# Patient Record
Sex: FEMALE | Race: WHITE | URBAN - METROPOLITAN AREA
[De-identification: names, ages, dates, MRNs, and addresses within clinical notes are randomized per-mention and may not be internally consistent; named-entity substitution may affect disease eponyms.]

---

## 2022-11-21 ENCOUNTER — APPOINTMENT (RX ONLY)
Dept: URBAN - METROPOLITAN AREA OTHER 5 | Facility: OTHER | Age: 27
Setting detail: DERMATOLOGY
End: 2022-11-21

## 2022-11-21 DIAGNOSIS — Z41.1 ENCOUNTER FOR COSMETIC SURGERY: ICD-10-CM

## 2022-11-21 PROCEDURE — ? BOTOX

## 2022-11-21 PROCEDURE — ? PATIENT SPECIFIC COUNSELING

## 2022-11-21 PROCEDURE — ? CONSULTATION - AGING FACE

## 2022-11-21 PROCEDURE — ? FILLERS

## 2022-11-21 ASSESSMENT — LOCATION SIMPLE DESCRIPTION DERM: LOCATION SIMPLE: LEFT CHEEK

## 2022-11-21 ASSESSMENT — LOCATION DETAILED DESCRIPTION DERM: LOCATION DETAILED: LEFT INFERIOR CENTRAL MALAR CHEEK

## 2022-11-21 ASSESSMENT — LOCATION ZONE DERM: LOCATION ZONE: FACE

## 2022-11-21 NOTE — PROCEDURE: BOTOX
Consent: Written consent was obtained prior to the procedure. Risks, benefits, expectations and alternatives were discussed including, but not limited to, infection, bleeding, lid/brow ptosis, bruising, swelling, diplopia, temporary effects, incomplete chemical denervation and dissatisfaction with the cosmetic outcome. No guarantee or warranty was given or implied regarding longevity of results.
Dilution (U/0.1 Cc): 2.2
Gregorio Units: 0
Price Per Unit In $ (Use Numbers Only, No Text Please.): 12
Expiration Date (Month Year): 08/2024
Reconstitution Date: 11/22
Detail Level: Detailed
Use Map Statement For Sites (Optional): Yes
Glabellar Complex Units: 15
Bill Summary Price Listed Below, Or Bill Total Of Units X Price Per Unit?: Bill #Units x Price Per Unit
Postcare Instructions: Patient instructed to not lie down for 4 hours and limit physical activity for 24 hours. Patient instructed not to travel by airplane for 48 hours.
Map Statment: See attached map for complete details

## 2022-11-21 NOTE — PROCEDURE: FILLERS
Additional Area 1 Volume In Cc: 0
Topical Anesthesia?: yes
Detail Level: Detailed
Lot #: [id number]
Expiration Date (Month Year): 02-
Filler: Juvederm Volbella XC
Expiration Date (Month Year): 22-08/2023
Consent: Written consent obtained. Risks include but not limited to bruising, beading, irregular texture, ulceration, infection, allergic reaction, scar formation, incomplete augmentation, temporary nature, procedural pain.
Additional Notes (Sticky): Dr. Christina bravo
Filler: Krista Hsu ([facility]-L)
Map Statment: See attached map for complete details
Lot #: [id number]
Additional Area 1 Location: earlobes

## 2022-11-21 NOTE — PROCEDURE: PATIENT SPECIFIC COUNSELING
Detail Level: Zone
Other (Free Text): 28 y/o female presents today for consultation- Botox and filler\\n-

## 2023-04-24 ENCOUNTER — APPOINTMENT (RX ONLY)
Dept: URBAN - METROPOLITAN AREA CLINIC 12 | Facility: CLINIC | Age: 28
Setting detail: DERMATOLOGY
End: 2023-04-24

## 2023-04-24 DIAGNOSIS — Z41.1 ENCOUNTER FOR COSMETIC SURGERY: ICD-10-CM

## 2023-04-24 PROCEDURE — ? BOTOX

## 2023-04-24 PROCEDURE — ? PATIENT SPECIFIC COUNSELING

## 2023-04-24 NOTE — PROCEDURE: PATIENT SPECIFIC COUNSELING
Other (Free Text): Dr. Dooley sample and he informed patient of expiration date.
Detail Level: Simple

## 2023-04-24 NOTE — PROCEDURE: BOTOX
Perioral Units: 0
Glabellar Complex Units: 20
Forehead Units: 15
Consent: Written consent was obtained prior to the procedure. Risks, benefits, expectations and alternatives were discussed including, but not limited to, infection, bleeding, lid/brow ptosis, bruising, swelling, diplopia, temporary effects, incomplete chemical denervation and dissatisfaction with the cosmetic outcome. No guarantee or warranty was given or implied regarding longevity of results.
Price Per Unit In $ (Use Numbers Only, No Text Please.): 12
Expiration Date (Month Year): 03/31/2025
Dilution (U/0.1 Cc): 4
Administered By (Optional): Dr. Dooley
Bill Summary Price Listed Below, Or Bill Total Of Units X Price Per Unit?: Bill #Units x Price Per Unit
Detail Level: Detailed
Show Price In Note?: yes
Map Statment: See attached map for complete details
Reconstitution Date: 04/24/2023
Lot #: [id number]
Use Map Statement For Sites (Optional): No
Postcare Instructions: Patient instructed to not lie down for 4 hours and limit physical activity for 24 hours. Patient instructed not to travel by airplane for 48 hours.